# Patient Record
Sex: FEMALE | Race: WHITE | URBAN - NONMETROPOLITAN AREA
[De-identification: names, ages, dates, MRNs, and addresses within clinical notes are randomized per-mention and may not be internally consistent; named-entity substitution may affect disease eponyms.]

---

## 2021-12-29 ENCOUNTER — APPOINTMENT (OUTPATIENT)
Dept: LAB | Facility: HOSPITAL | Age: 31
End: 2021-12-29
Attending: PHYSICIAN ASSISTANT
Payer: COMMERCIAL

## 2021-12-29 DIAGNOSIS — R50.9 HYPERTHERMIA-INDUCED DEFECT: ICD-10-CM

## 2021-12-29 PROCEDURE — U0002 COVID-19 LAB TEST NON-CDC: HCPCS | Performed by: PHYSICIAN ASSISTANT

## 2021-12-30 LAB — SARS-COV-2 RNA RESP QL NAA+PROBE: NOT DETECTED

## 2023-10-25 ENCOUNTER — HOSPITAL ENCOUNTER (EMERGENCY)
Facility: HOSPITAL | Age: 33
Discharge: HOME | End: 2023-10-25
Attending: EMERGENCY MEDICINE
Payer: MEDICAID

## 2023-10-25 VITALS
RESPIRATION RATE: 18 BRPM | HEART RATE: 75 BPM | DIASTOLIC BLOOD PRESSURE: 73 MMHG | SYSTOLIC BLOOD PRESSURE: 137 MMHG | HEIGHT: 61 IN | OXYGEN SATURATION: 100 % | WEIGHT: 176.38 LBS | BODY MASS INDEX: 33.3 KG/M2 | TEMPERATURE: 98 F

## 2023-10-25 DIAGNOSIS — R07.9 CHEST PAIN: ICD-10-CM

## 2023-10-25 DIAGNOSIS — U07.1 COVID: Primary | ICD-10-CM

## 2023-10-25 LAB
ALBUMIN SERPL BCP-MCNC: 4.4 G/DL (ref 3.5–5.2)
ALP SERPL-CCNC: 72 U/L (ref 55–135)
ALT SERPL W/O P-5'-P-CCNC: 23 U/L (ref 10–44)
ANION GAP SERPL CALC-SCNC: 9 MMOL/L (ref 3–11)
AST SERPL-CCNC: 19 U/L (ref 10–40)
B-HCG UR QL: NEGATIVE
BASOPHILS # BLD AUTO: 0.02 K/UL (ref 0–0.2)
BASOPHILS NFR BLD: 0.4 % (ref 0–1.9)
BILIRUB SERPL-MCNC: 0.4 MG/DL (ref 0.1–1)
BILIRUB UR QL STRIP: NEGATIVE
BUN SERPL-MCNC: 9 MG/DL (ref 6–20)
CALCIUM SERPL-MCNC: 9.2 MG/DL (ref 8.7–10.5)
CHLORIDE SERPL-SCNC: 102 MMOL/L (ref 95–110)
CLARITY UR: CLEAR
CO2 SERPL-SCNC: 28 MMOL/L (ref 23–29)
COLOR UR: YELLOW
CREAT SERPL-MCNC: 0.7 MG/DL (ref 0.5–1.4)
CTP QC/QA: YES
CTP QC/QA: YES
DIFFERENTIAL METHOD: NORMAL
EOSINOPHIL # BLD AUTO: 0 K/UL (ref 0–0.5)
EOSINOPHIL NFR BLD: 0.4 % (ref 0–8)
ERYTHROCYTE [DISTWIDTH] IN BLOOD BY AUTOMATED COUNT: 12 % (ref 11.5–14.5)
EST. GFR  (NO RACE VARIABLE): >60 ML/MIN/1.73 M^2
GLUCOSE SERPL-MCNC: 109 MG/DL (ref 70–110)
GLUCOSE UR QL STRIP: NEGATIVE
GROUP A STREP, MOLECULAR: NEGATIVE
HCT VFR BLD AUTO: 39.6 % (ref 37–48.5)
HGB BLD-MCNC: 13.6 G/DL (ref 12–16)
HGB UR QL STRIP: NEGATIVE
IMM GRANULOCYTES # BLD AUTO: 0.01 K/UL (ref 0–0.04)
IMM GRANULOCYTES NFR BLD AUTO: 0.2 % (ref 0–0.5)
KETONES UR QL STRIP: NEGATIVE
LEUKOCYTE ESTERASE UR QL STRIP: NEGATIVE
LIPASE SERPL-CCNC: 25 U/L (ref 13–75)
LYMPHOCYTES # BLD AUTO: 1.2 K/UL (ref 1–4.8)
LYMPHOCYTES NFR BLD: 25.8 % (ref 18–48)
MCH RBC QN AUTO: 30 PG (ref 27–31)
MCHC RBC AUTO-ENTMCNC: 34.3 G/DL (ref 32–36)
MCV RBC AUTO: 87 FL (ref 82–98)
MONOCYTES # BLD AUTO: 0.3 K/UL (ref 0.3–1)
MONOCYTES NFR BLD: 6.6 % (ref 4–15)
NEUTROPHILS # BLD AUTO: 3.1 K/UL (ref 1.8–7.7)
NEUTROPHILS NFR BLD: 66.6 % (ref 38–73)
NITRITE UR QL STRIP: NEGATIVE
NRBC BLD-RTO: 0 /100 WBC
PH UR STRIP: 7 [PH] (ref 5–8)
PLATELET # BLD AUTO: 262 K/UL (ref 150–450)
PMV BLD AUTO: 9.3 FL (ref 9.2–12.9)
POC MOLECULAR INFLUENZA A AGN: NEGATIVE
POC MOLECULAR INFLUENZA B AGN: NEGATIVE
POTASSIUM SERPL-SCNC: 3.8 MMOL/L (ref 3.5–5.1)
PROT SERPL-MCNC: 8.1 G/DL (ref 6–8.4)
PROT UR QL STRIP: NEGATIVE
RBC # BLD AUTO: 4.53 M/UL (ref 4–5.4)
SARS-COV-2 RDRP RESP QL NAA+PROBE: POSITIVE
SODIUM SERPL-SCNC: 139 MMOL/L (ref 136–145)
SP GR UR STRIP: 1.02 (ref 1–1.03)
TROPONIN I SERPL DL<=0.01 NG/ML-MCNC: <4 PG/ML (ref 0–60)
URN SPEC COLLECT METH UR: NORMAL
UROBILINOGEN UR STRIP-ACNC: NEGATIVE EU/DL
WBC # BLD AUTO: 4.72 K/UL (ref 3.9–12.7)

## 2023-10-25 PROCEDURE — 87635 SARS-COV-2 COVID-19 AMP PRB: CPT

## 2023-10-25 PROCEDURE — 25000003 PHARM REV CODE 250

## 2023-10-25 PROCEDURE — 81025 URINE PREGNANCY TEST: CPT

## 2023-10-25 PROCEDURE — 93010 EKG 12-LEAD: ICD-10-PCS | Mod: ,,, | Performed by: INTERNAL MEDICINE

## 2023-10-25 PROCEDURE — 81003 URINALYSIS AUTO W/O SCOPE: CPT

## 2023-10-25 PROCEDURE — 87651 STREP A DNA AMP PROBE: CPT

## 2023-10-25 PROCEDURE — 80053 COMPREHEN METABOLIC PANEL: CPT

## 2023-10-25 PROCEDURE — 99284 EMERGENCY DEPT VISIT MOD MDM: CPT | Mod: 25

## 2023-10-25 PROCEDURE — 36415 COLL VENOUS BLD VENIPUNCTURE: CPT

## 2023-10-25 PROCEDURE — 85025 COMPLETE CBC W/AUTO DIFF WBC: CPT

## 2023-10-25 PROCEDURE — 96374 THER/PROPH/DIAG INJ IV PUSH: CPT

## 2023-10-25 PROCEDURE — 93005 ELECTROCARDIOGRAM TRACING: CPT

## 2023-10-25 PROCEDURE — 96361 HYDRATE IV INFUSION ADD-ON: CPT

## 2023-10-25 PROCEDURE — 83690 ASSAY OF LIPASE: CPT

## 2023-10-25 PROCEDURE — 84484 ASSAY OF TROPONIN QUANT: CPT

## 2023-10-25 PROCEDURE — 63600175 PHARM REV CODE 636 W HCPCS

## 2023-10-25 PROCEDURE — 93010 ELECTROCARDIOGRAM REPORT: CPT | Mod: ,,, | Performed by: INTERNAL MEDICINE

## 2023-10-25 PROCEDURE — 96375 TX/PRO/DX INJ NEW DRUG ADDON: CPT

## 2023-10-25 RX ORDER — DIPHENHYDRAMINE HYDROCHLORIDE 50 MG/ML
25 INJECTION INTRAMUSCULAR; INTRAVENOUS
Status: COMPLETED | OUTPATIENT
Start: 2023-10-25 | End: 2023-10-25

## 2023-10-25 RX ORDER — ONDANSETRON 2 MG/ML
4 INJECTION INTRAMUSCULAR; INTRAVENOUS
Status: COMPLETED | OUTPATIENT
Start: 2023-10-25 | End: 2023-10-25

## 2023-10-25 RX ORDER — ONDANSETRON 4 MG/1
4 TABLET, FILM COATED ORAL EVERY 6 HOURS
Qty: 12 TABLET | Refills: 0 | Status: SHIPPED | OUTPATIENT
Start: 2023-10-25

## 2023-10-25 RX ORDER — PROCHLORPERAZINE MALEATE 10 MG
10 TABLET ORAL EVERY 6 HOURS PRN
Qty: 15 TABLET | Refills: 0 | Status: SHIPPED | OUTPATIENT
Start: 2023-10-25

## 2023-10-25 RX ORDER — PROCHLORPERAZINE EDISYLATE 5 MG/ML
10 INJECTION INTRAMUSCULAR; INTRAVENOUS
Status: COMPLETED | OUTPATIENT
Start: 2023-10-25 | End: 2023-10-25

## 2023-10-25 RX ORDER — KETOROLAC TROMETHAMINE 30 MG/ML
15 INJECTION, SOLUTION INTRAMUSCULAR; INTRAVENOUS
Status: COMPLETED | OUTPATIENT
Start: 2023-10-25 | End: 2023-10-25

## 2023-10-25 RX ORDER — DIPHENHYDRAMINE HCL 25 MG
25 CAPSULE ORAL EVERY 6 HOURS PRN
Qty: 15 CAPSULE | Refills: 0 | Status: SHIPPED | OUTPATIENT
Start: 2023-10-25

## 2023-10-25 RX ADMIN — KETOROLAC TROMETHAMINE 15 MG: 30 INJECTION, SOLUTION INTRAMUSCULAR; INTRAVENOUS at 03:10

## 2023-10-25 RX ADMIN — PROCHLORPERAZINE EDISYLATE 10 MG: 5 INJECTION INTRAMUSCULAR; INTRAVENOUS at 04:10

## 2023-10-25 RX ADMIN — SODIUM CHLORIDE 1000 ML: 9 INJECTION, SOLUTION INTRAVENOUS at 03:10

## 2023-10-25 RX ADMIN — DIPHENHYDRAMINE HYDROCHLORIDE 25 MG: 50 INJECTION, SOLUTION INTRAMUSCULAR; INTRAVENOUS at 04:10

## 2023-10-25 RX ADMIN — ONDANSETRON 4 MG: 2 INJECTION INTRAMUSCULAR; INTRAVENOUS at 03:10

## 2023-10-25 NOTE — DISCHARGE INSTRUCTIONS
You tested positive for COVID.  You can take Tylenol and ibuprofen as needed for pain, headaches, body aches, or fever. You can take the Compazine and Benadryl together for headaches. Take the zofran for nausea. Stay well hydrated. Follow up with primary care if symptoms do not improve.

## 2023-10-25 NOTE — ED PROVIDER NOTES
"Encounter Date: 10/25/2023       History     Chief Complaint   Patient presents with    Emesis     Patient reports "flu-like symptoms", N/V and headache. Patient took Tylenol @ 1 PM.     33-year-old female with no significant past medical history to ED with complaints of generalized abdominal pain, nausea, vomiting, body aches, sore throat that started 3 days ago.  No medications treatment attempted home.  Reported fever 3 days ago.  She reports not being able tolerate anything p.o..  No aggravating or relieving factors.    The history is provided by the patient.     Review of patient's allergies indicates:  No Known Allergies  No past medical history on file.  No past surgical history on file.  No family history on file.     Review of Systems   Constitutional:  Positive for fatigue and fever.   HENT:  Positive for congestion and sore throat.    Eyes: Negative.    Respiratory:  Negative for shortness of breath.    Cardiovascular:  Negative for chest pain.   Gastrointestinal:  Positive for abdominal pain, nausea and vomiting.   Endocrine: Negative.    Genitourinary:  Negative for dysuria.   Musculoskeletal:  Positive for myalgias. Negative for back pain.   Skin:  Negative for rash.   Allergic/Immunologic: Negative.    Neurological:  Negative for weakness.   Hematological:  Does not bruise/bleed easily.   Psychiatric/Behavioral: Negative.         Physical Exam     Initial Vitals [10/25/23 1451]   BP Pulse Resp Temp SpO2   (!) 160/87 84 20 98.2 °F (36.8 °C) 99 %      MAP       --         Physical Exam    Nursing note and vitals reviewed.  Constitutional: She appears well-developed and well-nourished.   HENT:   Head: Normocephalic and atraumatic.   Mouth/Throat: Uvula is midline. Mucous membranes are dry. No trismus in the jaw. No uvula swelling. Posterior oropharyngeal edema and posterior oropharyngeal erythema present.   Eyes: EOM are normal.   Neck: Neck supple.   Normal range of motion.  Cardiovascular:  Normal rate " and regular rhythm.           Pulmonary/Chest: Breath sounds normal. No respiratory distress. She has no wheezes.   Abdominal: Abdomen is soft. She exhibits no distension. There is no abdominal tenderness.   Musculoskeletal:         General: Normal range of motion.      Cervical back: Normal range of motion and neck supple.     Neurological: She is alert and oriented to person, place, and time.   Skin: Skin is warm and dry.   Psychiatric: Thought content normal.         ED Course   Procedures  Labs Reviewed   SARS-COV-2 RDRP GENE - Abnormal; Notable for the following components:       Result Value    POC Rapid COVID Positive (*)     All other components within normal limits   GROUP A STREP, MOLECULAR   CBC W/ AUTO DIFFERENTIAL   COMPREHENSIVE METABOLIC PANEL   LIPASE   URINALYSIS, REFLEX TO URINE CULTURE    Narrative:     Preferred Collection Type->Urine, Clean Catch  Specimen Source->Urine   PREGNANCY TEST, URINE RAPID    Narrative:     Specimen Source->Urine   TROPONIN I HIGH SENSITIVITY   POCT INFLUENZA A/B MOLECULAR     EKG Readings: (Independently Interpreted)   Initial Reading: No STEMI. Rhythm: Normal Sinus Rhythm. Heart Rate: 73. Ectopy: No Ectopy. Conduction: Normal.       Imaging Results    None          Medications   sodium chloride 0.9% bolus 1,000 mL 1,000 mL (0 mLs Intravenous Stopped 10/25/23 1617)   ondansetron injection 4 mg (4 mg Intravenous Given 10/25/23 1517)   ketorolac injection 15 mg (15 mg Intravenous Given 10/25/23 1517)   prochlorperazine injection Soln 10 mg (10 mg Intravenous Given 10/25/23 1608)   diphenhydrAMINE injection 25 mg (25 mg Intravenous Given 10/25/23 1608)     Medical Decision Making  33-year-old female to ED for above complaints.  She was nontoxic-appearing.  She was not ill-appearing.  She tested positive for COVID today.  Reports of shortness a breath.  She was not hypoxic.  CBC, CMP, troponin, lipase were all unremarkable.  Strep was negative.  Flu was negative.  UA was  without signs of infection.  She was given 1 L of normal saline, Zofran for nausea symptoms.  Patient then complained of headache for which she was given Toradol, Compazine, Benadryl with good improvement in symptoms.  Additionally she complained of chest pain.  EKG was normal sinus rhythm without ST elevation.  Will discharge home with Compazine and Benadryl for headaches.  He was given Zofran for nausea.  She was instructed to hydrate well and take Tylenol and ibuprofen as needed for pain or fever.  Return precautions given.  Patient was to follow up with primary care.  Instructed to quarantine per CDC guidelines.    Amount and/or Complexity of Data Reviewed  Labs: ordered.    Risk  OTC drugs.  Prescription drug management.                               Clinical Impression:   Final diagnoses:  [R07.9] Chest pain  [U07.1] COVID (Primary)        ED Disposition Condition    Discharge Stable          ED Prescriptions       Medication Sig Dispense Start Date End Date Auth. Provider    prochlorperazine (COMPAZINE) 10 MG tablet Take 1 tablet (10 mg total) by mouth every 6 (six) hours as needed. 15 tablet 10/25/2023 -- Alfonso Robles NP    diphenhydrAMINE (BENADRYL) 25 mg capsule Take 1 capsule (25 mg total) by mouth every 6 (six) hours as needed for Itching or Allergies. 15 capsule 10/25/2023 -- Alfonso Robles NP    ondansetron (ZOFRAN) 4 MG tablet Take 1 tablet (4 mg total) by mouth every 6 (six) hours. 12 tablet 10/25/2023 -- Alfonso Robles NP          Follow-up Information       Follow up With Specialties Details Why Contact [facility], Internal Medicine, Gynecology, Dental General Practice In 2 days  [id number] [facility] [address]  [phone]               Alfonso Robles NP  10/25/23 8061